# Patient Record
Sex: MALE | Race: WHITE | URBAN - METROPOLITAN AREA
[De-identification: names, ages, dates, MRNs, and addresses within clinical notes are randomized per-mention and may not be internally consistent; named-entity substitution may affect disease eponyms.]

---

## 2019-07-31 ENCOUNTER — TRANSFERRED RECORDS (OUTPATIENT)
Dept: HEALTH INFORMATION MANAGEMENT | Facility: CLINIC | Age: 74
End: 2019-07-31

## 2019-07-31 ENCOUNTER — MEDICAL CORRESPONDENCE (OUTPATIENT)
Dept: HEALTH INFORMATION MANAGEMENT | Facility: CLINIC | Age: 74
End: 2019-07-31

## 2019-08-01 DIAGNOSIS — H35.372 EPIRETINAL MEMBRANE (ERM) OF LEFT EYE: Primary | ICD-10-CM

## 2019-08-08 ENCOUNTER — TELEPHONE (OUTPATIENT)
Dept: OPHTHALMOLOGY | Facility: CLINIC | Age: 74
End: 2019-08-08

## 2019-08-08 NOTE — TELEPHONE ENCOUNTER
Called patient to schedule procedure with Dr. Canales, there was no answer. Left message with my direct line [phone].

## 2019-08-08 NOTE — TELEPHONE ENCOUNTER
Patient is scheduled for surgery with Dr. Canales     Spoke or left message with: patient    Date of Surgery: 9/10/19    [place] [facility] [place]    Informed patient they will need an adult  Yes    Pre-op with surgeon (if applicable): 9/9/19 see and do evalaution with Dr. Canales    H&P: Scheduled with Dr. Brewer at [facility]    Additional imaging/appointments: post op appointment scheduled.    Surgery packet: mailed 8/8/19    Additional comments: Advised RN will afsaneh 1 - 3 days prior with arrival time and instructions. Will see Dr. Kaden Berumen for other post op appointments.

## 2019-09-05 RX ORDER — CELECOXIB 200 MG/1
200 CAPSULE ORAL DAILY
COMMUNITY

## 2019-09-05 RX ORDER — WARFARIN SODIUM 5 MG/1
5 TABLET ORAL DAILY
COMMUNITY

## 2019-09-05 RX ORDER — MULTIVITAMIN,THERAPEUTIC
1 TABLET ORAL DAILY
COMMUNITY

## 2019-09-05 RX ORDER — COLCHICINE 0.6 MG/1
0.6 TABLET ORAL DAILY
COMMUNITY

## 2019-09-05 RX ORDER — LOSARTAN POTASSIUM 100 MG/1
100 TABLET ORAL DAILY
COMMUNITY

## 2019-09-05 RX ORDER — TRAMADOL HYDROCHLORIDE 50 MG/1
50 TABLET ORAL
COMMUNITY

## 2019-09-05 RX ORDER — CYCLOBENZAPRINE HCL 10 MG
10 TABLET ORAL 3 TIMES DAILY PRN
COMMUNITY

## 2019-09-05 RX ORDER — PANTOPRAZOLE SODIUM 40 MG/1
40 TABLET, DELAYED RELEASE ORAL
COMMUNITY

## 2019-09-05 RX ORDER — GABAPENTIN 600 MG/1
600 TABLET ORAL
COMMUNITY

## 2019-09-05 RX ORDER — HYDROXYCHLOROQUINE SULFATE 200 MG/1
200 TABLET, FILM COATED ORAL
COMMUNITY

## 2019-09-05 RX ORDER — TAMSULOSIN HYDROCHLORIDE 0.4 MG/1
0.4 CAPSULE ORAL
COMMUNITY

## 2019-09-05 RX ORDER — FINASTERIDE 5 MG/1
5 TABLET, FILM COATED ORAL DAILY
COMMUNITY

## 2019-09-05 RX ORDER — ROSUVASTATIN CALCIUM 40 MG/1
40 TABLET, COATED ORAL
COMMUNITY

## 2019-09-09 ENCOUNTER — ANESTHESIA EVENT (OUTPATIENT)
Dept: SURGERY | Facility: AMBULATORY SURGERY CENTER | Age: 74
End: 2019-09-09

## 2019-09-09 ENCOUNTER — OFFICE VISIT (OUTPATIENT)
Dept: OPHTHALMOLOGY | Facility: CLINIC | Age: 74
End: 2019-09-09
Attending: OPHTHALMOLOGY
Payer: COMMERCIAL

## 2019-09-09 DIAGNOSIS — H35.372 EPIRETINAL MEMBRANE (ERM) OF LEFT EYE: Primary | ICD-10-CM

## 2019-09-09 DIAGNOSIS — H43.812 VITREOUS DEGENERATION AND DETACHMENT OF LEFT EYE: ICD-10-CM

## 2019-09-09 PROCEDURE — 92134 CPTRZ OPH DX IMG PST SGM RTA: CPT | Mod: ZF | Performed by: OPHTHALMOLOGY

## 2019-09-09 PROCEDURE — G0463 HOSPITAL OUTPT CLINIC VISIT: HCPCS | Mod: ZF

## 2019-09-09 ASSESSMENT — VISUAL ACUITY
CORRECTION_TYPE: GLASSES
OS_PH_CC+: -1
METHOD: SNELLEN - LINEAR
OD_CC+: -3
OS_PH_CC: 20/80
OD_CC: 20/25
OS_CC: 20/100

## 2019-09-09 ASSESSMENT — REFRACTION_WEARINGRX
SPECS_TYPE: PAL
OS_SPHERE: +0.25
OD_SPHERE: PLANO
OD_CYLINDER: +0.75
OD_ADD: +2.00
OD_AXIS: 165
OS_CYLINDER: +0.50
OS_AXIS: 146
OS_ADD: +2.00

## 2019-09-09 ASSESSMENT — TONOMETRY
OD_IOP_MMHG: 13
IOP_METHOD: TONOPEN
OS_IOP_MMHG: 12

## 2019-09-09 ASSESSMENT — EXTERNAL EXAM - LEFT EYE: OS_EXAM: NORMAL

## 2019-09-09 ASSESSMENT — CUP TO DISC RATIO
OD_RATIO: 0.1
OS_RATIO: 0.1

## 2019-09-09 ASSESSMENT — CONF VISUAL FIELD
OS_NORMAL: 1
METHOD: COUNTING FINGERS
OD_NORMAL: 1

## 2019-09-09 ASSESSMENT — EXTERNAL EXAM - RIGHT EYE: OD_EXAM: NORMAL

## 2019-09-09 ASSESSMENT — SLIT LAMP EXAM - LIDS
COMMENTS: NORMAL
COMMENTS: NORMAL

## 2019-09-09 NOTE — PROGRESS NOTES
"CC -   ERM left eye     INTERVAL HISTORY - Initial visit  He is schedule with Dr Canales for surery on 9-10-19.        HPI -   Brandt Diallo is a  73 year old year-old patient referred by Dr Kaden Berumen for evaluation and treat of an ERM left eye.   VA OS noted to worsen since 2016.  Seen 2016 by Dr. Berumen found VA OS 20/30, has distortion OS, image interferes, sees diplopia  Noted ET OS as child per patient, told has \"lazy eye\" but no patching, no strabismus as adult, feels VA OS not as good as OD  No DM    PAST OCULAR SURGERY  None    RETINAL IMAGING:  OCT  9-9-19  OD - retina normal, PHF attached  OS - ERM, moderate distortion, mild CME      ASSESSMENT & PLAN    1. ERM OS   - advised PPV/MS   - scheduled with Parker tomorrow     - r/b/a d/w patient: vision loss, blindness, infection, bleeding   - retinal detachment, need for more surgeries, need for gas or oil bubble and bubble restrictions   - cataract, diplopia, refractive change   - persistent blurriness, distortion, or scotoma   - participation of fellow or resident      2. PVD OS, syneresis OD      3. NS OU        return to clinic: for Surgery tomorrow with Parker    ATTESTATION     Attending Attestation:     Complete documentation of historical and exam elements from today's encounter can be found in the full encounter summary report (not reduplicated in this progress note).  I personally obtained the chief complaint(s) and history of present illness.  I confirmed and edited as necessary the review of systems, past medical/surgical history, family history, social history, and examination findings as documented by others; and I examined the patient myself.  I personally reviewed the relevant tests, images, and reports as documented above.  I formulated and edited as necessary the assessment and plan and discussed the findings and management plan with the patient and family    Radha Spann MD, PhD  , Vitreoretinal " Surgery  Department of Ophthalmology  [facility]

## 2019-09-09 NOTE — ANESTHESIA PREPROCEDURE EVALUATION
Anesthesia Pre-Procedure Evaluation    Patient: Brandt Solimanhl   MRN:     [id number] Gender:   male   Age:    73 year old :      1945        Preoperative Diagnosis: Epiretinal Membrane   Procedure(s):  Left Eye 23 Vitrectomy, Fluid Gas Exchange, Membrane Peel     Past Medical History:   Diagnosis Date     Anemia      Atrial fibrillation (H)      Lake's esophagus      Chronic vascular insufficiency of intestine (H)      Degeneration of intervertebral disc at C4-C5 level      Elevated liver function tests      Gastroesophageal reflux disease      Hyperlipidemia      Hypertension      Long term current use of anticoagulant      Neuropathy       No past surgical history on file.                PHYSICAL EXAM:   Mental Status/Neuro: A/A/O   Airway: Facies: Feasible  Mallampati: II  Mouth/Opening: Full  TM distance: > 6 cm  Neck ROM: Full   Respiratory: Auscultation: CTAB     Resp. Rate: Normal     Resp. Effort: Normal      CV: Rhythm: Regular  Rate: Age appropriate  Heart: Normal Sounds  Edema: None   Comments:                      LABS:  CBC: No results found for: WBC, HGB, HCT, PLT  BMP: No results found for: NA, POTASSIUM, CHLORIDE, CO2, BUN, CR, GLC  COAGS: No results found for: PTT, INR, FIBR  POC: No results found for: BGM, HCG, HCGS  OTHER: No results found for: PH, LACT, A1C, CHRISTOPHER, PHOS, MAG, ALBUMIN, PROTTOTAL, ALT, AST, GGT, ALKPHOS, BILITOTAL, BILIDIRECT, LIPASE, AMYLASE, ALLAN, TSH, T4, T3, CRP, SED     Preop Vitals    BP Readings from Last 3 Encounters:   No data found for BP    Pulse Readings from Last 3 Encounters:   No data found for Pulse      Resp Readings from Last 3 Encounters:   No data found for Resp    SpO2 Readings from Last 3 Encounters:   No data found for SpO2      Temp Readings from Last 1 Encounters:   No data found for Temp    Ht Readings from Last 1 Encounters:   No data found for Ht      Wt Readings from Last 1 Encounters:   No data found for Wt    There is no height or weight  on file to calculate BMI.     LDA:        Assessment:   ASA SCORE: 3    H&P: History and physical reviewed and following examination; no interval change.    NPO Status: NPO Appropriate     Plan:   Anes. Type:  MAC   Pre-Medication: None   Induction:  N/a   Airway: Native Airway   Access/Monitoring: PIV   Maintenance: N/a     Postop Plan:   Postop Pain: None  Postop Sedation/Airway: Not planned  Disposition: Outpatient     PONV Management:    Prevention: Ondansetron, Dexamethasone     CONSENT: Direct conversation   Plan and risks discussed with: Patient          Comments for Plan/Consent:  MAC + Retrobulbar  INR??                 Jamshid Peres MD     ANESTHESIA PREOP EVALUATION    PROCEDURE: Procedure(s):  Left Eye 23 Vitrectomy, Fluid Gas Exchange, Membrane Peel    HPI: Brandt Diallo is a 73 year old male who presents for above procedure 2/2 epiretinal membrane.     PAST MEDICAL HISTORY:    Past Medical History:   Diagnosis Date     Anemia      Atrial fibrillation (H)      Lake's esophagus      Chronic vascular insufficiency of intestine (H)      Degeneration of intervertebral disc at C4-C5 level      Elevated liver function tests      Gastroesophageal reflux disease      Hyperlipidemia      Hypertension      Long term current use of anticoagulant      Neuropathy        PAST SURGICAL HISTORY:    No past surgical history on file.    SOCIAL HISTORY:       Social History     Tobacco Use     Smoking status: Former Smoker     Types: Cigarettes     Last attempt to quit: 1953     Years since quittin.7     Smokeless tobacco: Never Used   Substance Use Topics     Alcohol use: Not on file       ALLERGIES:     No Known Allergies    MEDICATIONS:       (Not in a hospital admission)    Current Outpatient Medications   Medication Sig Dispense Refill     albuterol (PROAIR RESPICLICK) 108 (90 Base) MCG/ACT inhaler        celecoxib (CELEBREX) 200 MG capsule Take 200 mg by mouth daily       colchicine (COLCYRS) 0.6 MG  tablet Take 0.6 mg by mouth daily       cyclobenzaprine (FLEXERIL) 10 MG tablet Take 10 mg by mouth 3 times daily as needed for muscle spasms       finasteride (PROSCAR) 5 MG tablet Take 5 mg by mouth daily       gabapentin (NEURONTIN) 600 MG tablet Take 600 mg by mouth 2 times daily       hydroxychloroquine (PLAQUENIL) 200 MG tablet Take 200 mg by mouth       losartan (COZAAR) 100 MG tablet Take 100 mg by mouth daily       multivitamin, therapeutic (THERA-VIT) TABS tablet Take 1 tablet by mouth daily       rosuvastatin (CRESTOR) 40 MG tablet Take 40 mg by mouth       tamsulosin (FLOMAX) 0.4 MG capsule Take 0.4 mg by mouth       traMADol (ULTRAM) 50 MG tablet Take 50 mg by mouth       warfarin (COUMADIN) 5 MG tablet Take 5 mg by mouth daily Take two tablets by mouth Tuesday, Friday and take one and one and half tablets on all other days of the week as directed       pantoprazole (PROTONIX) 40 MG EC tablet Take 40 mg by mouth         Current Outpatient Medications Ordered in Epic   Medication Sig Dispense Refill     albuterol (PROAIR RESPICLICK) 108 (90 Base) MCG/ACT inhaler        celecoxib (CELEBREX) 200 MG capsule Take 200 mg by mouth daily       colchicine (COLCYRS) 0.6 MG tablet Take 0.6 mg by mouth daily       cyclobenzaprine (FLEXERIL) 10 MG tablet Take 10 mg by mouth 3 times daily as needed for muscle spasms       finasteride (PROSCAR) 5 MG tablet Take 5 mg by mouth daily       gabapentin (NEURONTIN) 600 MG tablet Take 600 mg by mouth 2 times daily       hydroxychloroquine (PLAQUENIL) 200 MG tablet Take 200 mg by mouth       losartan (COZAAR) 100 MG tablet Take 100 mg by mouth daily       multivitamin, therapeutic (THERA-VIT) TABS tablet Take 1 tablet by mouth daily       rosuvastatin (CRESTOR) 40 MG tablet Take 40 mg by mouth       tamsulosin (FLOMAX) 0.4 MG capsule Take 0.4 mg by mouth       traMADol (ULTRAM) 50 MG tablet Take 50 mg by mouth       warfarin (COUMADIN) 5 MG tablet Take 5 mg by mouth daily  Take two tablets by mouth Tuesday, Friday and take one and one and half tablets on all other days of the week as directed       pantoprazole (PROTONIX) 40 MG EC tablet Take 40 mg by mouth       No current Epic-ordered facility-administered medications on file.        PHYSICAL EXAM:    Vitals: T Data Unavailable, P Data Unavailable, BP Data Unavailable, R Data Unavailable, SpO2  , Weight Wt Readings from Last 2 Encounters:   No data found for Wt       See doc flowsheet    NPO STATUS: see doc flowsheet    LABS:    BMP:  No results for input(s): NA, POTASSIUM, CHLORIDE, CO2, BUN, CR, GLC, CHRISTOPHER in the last 59504 hours.    LFTs:   No results for input(s): PROTTOTAL, ALBUMIN, BILITOTAL, ALKPHOS, AST, ALT, BILIDIRECT in the last 47849 hours.    CBC:   No results for input(s): WBC, RBC, HGB, HCT, MCV, MCH, MCHC, RDW, PLT in the last 57064 hours.    Coags:  No results for input(s): INR, PTT, FIBR in the last 24520 hours.    Imaging:  No orders to display       Jamshid Peres MD  Anesthesiology Staff  Pager [phone]    9/9/2019  4:15 PM

## 2019-09-09 NOTE — NURSING NOTE
Chief Complaints and History of Present Illnesses   Patient presents with     Epiretinal Membrane Evaluation     Referred by Dr. Zak Berumen      Chief Complaint(s) and History of Present Illness(es)     Epiretinal Membrane Evaluation     Comments: Referred by Dr. Zak Berumen               Comments     Pt feels his vision is stable in both eyes for the last few months.  Complains of occasional diplopia and LE becoming fatigued easily.  Hx of floaters- unchanged.  Denies any flashes, pain, pressure, irritation, discharge, and tearing.    Nuria Beckham OT 12:08 PM September 9, 2019

## 2019-09-10 ENCOUNTER — HOSPITAL ENCOUNTER (OUTPATIENT)
Facility: AMBULATORY SURGERY CENTER | Age: 74
End: 2019-09-10
Attending: OPHTHALMOLOGY
Payer: MEDICARE

## 2019-09-10 ENCOUNTER — ANESTHESIA (OUTPATIENT)
Dept: SURGERY | Facility: AMBULATORY SURGERY CENTER | Age: 74
End: 2019-09-10

## 2019-09-10 VITALS
HEIGHT: 72 IN | RESPIRATION RATE: 16 BRPM | TEMPERATURE: 98.3 F | SYSTOLIC BLOOD PRESSURE: 127 MMHG | DIASTOLIC BLOOD PRESSURE: 80 MMHG | WEIGHT: 205 LBS | HEART RATE: 57 BPM | BODY MASS INDEX: 27.77 KG/M2 | OXYGEN SATURATION: 94 %

## 2019-09-10 DIAGNOSIS — Z48.810 AFTERCARE FOLLOWING SURGERY OF A SENSE ORGAN: Primary | ICD-10-CM

## 2019-09-10 DIAGNOSIS — H35.372 EPIRETINAL MEMBRANE (ERM) OF LEFT EYE: ICD-10-CM

## 2019-09-10 LAB — INR PPP: 1.07 (ref 0.86–1.14)

## 2019-09-10 RX ORDER — NEOMYCIN POLYMYXIN B SULFATES AND DEXAMETHASONE 3.5; 10000; 1 MG/ML; [USP'U]/ML; MG/ML
1 SUSPENSION/ DROPS OPHTHALMIC 4 TIMES DAILY
Qty: 5 ML | Refills: 0 | Status: SHIPPED | OUTPATIENT
Start: 2019-09-10

## 2019-09-10 RX ORDER — CYCLOPENTOLATE HYDROCHLORIDE 10 MG/ML
1 SOLUTION/ DROPS OPHTHALMIC
Status: COMPLETED | OUTPATIENT
Start: 2019-09-10 | End: 2019-09-10

## 2019-09-10 RX ORDER — SODIUM CHLORIDE, SODIUM LACTATE, POTASSIUM CHLORIDE, CALCIUM CHLORIDE 600; 310; 30; 20 MG/100ML; MG/100ML; MG/100ML; MG/100ML
INJECTION, SOLUTION INTRAVENOUS CONTINUOUS
Status: DISCONTINUED | OUTPATIENT
Start: 2019-09-10 | End: 2019-09-11 | Stop reason: HOSPADM

## 2019-09-10 RX ORDER — FENTANYL CITRATE 50 UG/ML
INJECTION, SOLUTION INTRAMUSCULAR; INTRAVENOUS PRN
Status: DISCONTINUED | OUTPATIENT
Start: 2019-09-10 | End: 2019-09-10

## 2019-09-10 RX ORDER — ONDANSETRON 2 MG/ML
4 INJECTION INTRAMUSCULAR; INTRAVENOUS EVERY 30 MIN PRN
Status: DISCONTINUED | OUTPATIENT
Start: 2019-09-10 | End: 2019-09-11 | Stop reason: HOSPADM

## 2019-09-10 RX ORDER — PHENYLEPHRINE HYDROCHLORIDE 25 MG/ML
SOLUTION/ DROPS OPHTHALMIC PRN
Status: DISCONTINUED | OUTPATIENT
Start: 2019-09-10 | End: 2019-09-10 | Stop reason: HOSPADM

## 2019-09-10 RX ORDER — PROPOFOL 10 MG/ML
INJECTION, EMULSION INTRAVENOUS PRN
Status: DISCONTINUED | OUTPATIENT
Start: 2019-09-10 | End: 2019-09-10

## 2019-09-10 RX ORDER — LIDOCAINE 40 MG/G
CREAM TOPICAL
Status: DISCONTINUED | OUTPATIENT
Start: 2019-09-10 | End: 2019-09-10 | Stop reason: HOSPADM

## 2019-09-10 RX ORDER — PHENYLEPHRINE HYDROCHLORIDE 25 MG/ML
1 SOLUTION/ DROPS OPHTHALMIC
Status: COMPLETED | OUTPATIENT
Start: 2019-09-10 | End: 2019-09-10

## 2019-09-10 RX ORDER — DEXAMETHASONE SODIUM PHOSPHATE 4 MG/ML
INJECTION, SOLUTION INTRA-ARTICULAR; INTRALESIONAL; INTRAMUSCULAR; INTRAVENOUS; SOFT TISSUE PRN
Status: DISCONTINUED | OUTPATIENT
Start: 2019-09-10 | End: 2019-09-10 | Stop reason: HOSPADM

## 2019-09-10 RX ORDER — SODIUM CHLORIDE, SODIUM LACTATE, POTASSIUM CHLORIDE, CALCIUM CHLORIDE 600; 310; 30; 20 MG/100ML; MG/100ML; MG/100ML; MG/100ML
INJECTION, SOLUTION INTRAVENOUS CONTINUOUS
Status: DISCONTINUED | OUTPATIENT
Start: 2019-09-10 | End: 2019-09-10 | Stop reason: HOSPADM

## 2019-09-10 RX ORDER — ONDANSETRON 2 MG/ML
INJECTION INTRAMUSCULAR; INTRAVENOUS PRN
Status: DISCONTINUED | OUTPATIENT
Start: 2019-09-10 | End: 2019-09-10

## 2019-09-10 RX ORDER — ONDANSETRON 4 MG/1
4 TABLET, ORALLY DISINTEGRATING ORAL EVERY 30 MIN PRN
Status: DISCONTINUED | OUTPATIENT
Start: 2019-09-10 | End: 2019-09-11 | Stop reason: HOSPADM

## 2019-09-10 RX ORDER — LIDOCAINE HYDROCHLORIDE 20 MG/ML
INJECTION, SOLUTION INFILTRATION; PERINEURAL PRN
Status: DISCONTINUED | OUTPATIENT
Start: 2019-09-10 | End: 2019-09-10

## 2019-09-10 RX ORDER — ACETAMINOPHEN 325 MG/1
975 TABLET ORAL ONCE
Status: COMPLETED | OUTPATIENT
Start: 2019-09-10 | End: 2019-09-10

## 2019-09-10 RX ORDER — BALANCED SALT SOLUTION 6.4; .75; .48; .3; 3.9; 1.7 MG/ML; MG/ML; MG/ML; MG/ML; MG/ML; MG/ML
SOLUTION OPHTHALMIC PRN
Status: DISCONTINUED | OUTPATIENT
Start: 2019-09-10 | End: 2019-09-10 | Stop reason: HOSPADM

## 2019-09-10 RX ORDER — MEPERIDINE HYDROCHLORIDE 25 MG/ML
12.5 INJECTION INTRAMUSCULAR; INTRAVENOUS; SUBCUTANEOUS
Status: DISCONTINUED | OUTPATIENT
Start: 2019-09-10 | End: 2019-09-11 | Stop reason: HOSPADM

## 2019-09-10 RX ORDER — TROPICAMIDE 10 MG/ML
SOLUTION/ DROPS OPHTHALMIC PRN
Status: DISCONTINUED | OUTPATIENT
Start: 2019-09-10 | End: 2019-09-10 | Stop reason: HOSPADM

## 2019-09-10 RX ORDER — ATROPINE SULFATE 10 MG/ML
SOLUTION/ DROPS OPHTHALMIC PRN
Status: DISCONTINUED | OUTPATIENT
Start: 2019-09-10 | End: 2019-09-10 | Stop reason: HOSPADM

## 2019-09-10 RX ORDER — NALOXONE HYDROCHLORIDE 0.4 MG/ML
.1-.4 INJECTION, SOLUTION INTRAMUSCULAR; INTRAVENOUS; SUBCUTANEOUS
Status: DISCONTINUED | OUTPATIENT
Start: 2019-09-10 | End: 2019-09-11 | Stop reason: HOSPADM

## 2019-09-10 RX ADMIN — LIDOCAINE HYDROCHLORIDE 20 MG: 20 INJECTION, SOLUTION INFILTRATION; PERINEURAL at 08:53

## 2019-09-10 RX ADMIN — CYCLOPENTOLATE HYDROCHLORIDE 1 DROP: 10 SOLUTION/ DROPS OPHTHALMIC at 08:10

## 2019-09-10 RX ADMIN — PHENYLEPHRINE HYDROCHLORIDE 1 DROP: 25 SOLUTION/ DROPS OPHTHALMIC at 08:05

## 2019-09-10 RX ADMIN — FENTANYL CITRATE 50 MCG: 50 INJECTION, SOLUTION INTRAMUSCULAR; INTRAVENOUS at 08:50

## 2019-09-10 RX ADMIN — PHENYLEPHRINE HYDROCHLORIDE 1 DROP: 25 SOLUTION/ DROPS OPHTHALMIC at 08:10

## 2019-09-10 RX ADMIN — CYCLOPENTOLATE HYDROCHLORIDE 1 DROP: 10 SOLUTION/ DROPS OPHTHALMIC at 08:05

## 2019-09-10 RX ADMIN — ONDANSETRON 4 MG: 2 INJECTION INTRAMUSCULAR; INTRAVENOUS at 08:50

## 2019-09-10 RX ADMIN — SODIUM CHLORIDE, SODIUM LACTATE, POTASSIUM CHLORIDE, CALCIUM CHLORIDE: 600; 310; 30; 20 INJECTION, SOLUTION INTRAVENOUS at 07:56

## 2019-09-10 RX ADMIN — PHENYLEPHRINE HYDROCHLORIDE 1 DROP: 25 SOLUTION/ DROPS OPHTHALMIC at 08:00

## 2019-09-10 RX ADMIN — CYCLOPENTOLATE HYDROCHLORIDE 1 DROP: 10 SOLUTION/ DROPS OPHTHALMIC at 08:00

## 2019-09-10 RX ADMIN — PROPOFOL 30 MG: 10 INJECTION, EMULSION INTRAVENOUS at 08:53

## 2019-09-10 RX ADMIN — ACETAMINOPHEN 975 MG: 325 TABLET ORAL at 07:56

## 2019-09-10 ASSESSMENT — MIFFLIN-ST. JEOR: SCORE: 1702.87

## 2019-09-10 NOTE — DISCHARGE INSTRUCTIONS
[facility]  [phone]  Post Operative Eye Surgery Instructions    Gail Canales MD  Will I have pain?  Some discomfort is normal and expected following surgery. The first few days after surgery you may need to use prescription pain pills. Taking Tylenol (acetaminophen) regularly may help prevent pain.  Discomfort should gradually decrease and Tylenol should be sufficient to relieve pain. A foreign body sensation in the cornea of the eye is very common and caused by sutures placed at surgery. These sutures will go away in one to two weeks. If the pain worsens, you should call the doctor.  Do I need to wear an eye patch?  You do not need to wear an eye patch at home after the doctor has removed the patch on your first day after surgery. However, you may be more comfortable wearing a patch outside in the sun, when sleeping or napping, or in a morgan, windy environment.  How much drainage should I have?  You may expect a moderate amount of drainage for a week. Gradually the drainage should decrease. The lids can be cleaned with a clean washcloth and gentle soap or diluted baby shampoo. Wipe the eyelids gently from the nose outward. Some blood in the tears is a normal finding.  Will there be swelling?  Some swelling is normal for about a week or two after which it will gradually decrease. Applying a cool compress, using a clean washcloth for 5 - 10 minutes several times a day may reduce the swelling and make you more comfortable. People may have some swelling of both eyes, especially if face down positioning is required. The white part of the eye may appear very red or bloody for a week or two. This may get worse a few days after surgery. Though the bright red appearance can look frightening, it is a normal finding early after surgery and will resolve in a few weeks.  Will I need to use eye drops?  You will be using several different kinds of eye drops or ointment (salve) when you leave the hospital.  The directions will be on each bottle or tube. The medication with the red top will keep your eye dilated and may make your eye more sensitive to light. Wearing sunglasses may help. The other medication is a combination antibiotic/steroid to prevent infection and promote healing.  Occasionally a third drop is used to control the pressure in your eye. A new bottle of artificial tears or lubricant ointment may be used along with your prescription eye drops after surgery. You will be using drops from four to eight weeks. Bring all eye medications (drops. ointments, or pills) with you  to each visit.   Always wash your hands before putting in the eye drops. You may wish to have someone else help you. Pull down on the lower lid and squeeze one drop from the bottle being careful not to touch the dropper to your eye or eyelid. One drop is sufficient, but another may be used if the first did not go into the eye. It is often easier to put in the drops if you are reclining or lying down. Wait five (5) minutes after the first drop before using the second drop to allow the medications to absorb into the eye.  How long will it take for my vision to improve?  Your vision should gradually improve, but it may take up to six months to regain your best vision. Frequently, air or gas bubbles are injected into the eye at the time of surgery. This will blur your vision significantly at first. As the bubble becomes smaller it will cause a black line in your vision that moves as you move your head. As the bubble becomes smaller you may notice that it looks more like a bubble or that it will break up into several smaller bubbles. It will take from a few days to a few weeks for the bubble to dissolve and be replaced by clear fluid.  You may notice floaters or double vision after your surgery. These symptoms usually will decrease with time. If the double vision is bothersome patching the eye may help.  If you notice a sudden worsening in  your vision call your doctor.  Are there any physical restrictions after surgery?  If an air or gas bubble was placed in the eye during surgery, you will be asked to spend most of your time (both awake and during the night) with your head in a specific position, frequently face down. As the eye heals and the bubble dissolves there will be less of a need for you to stay in that specific position. You should avoid sleeping on your back until the bubble has totally dissolved and you have been given permission from your surgeon. You should not fly in an airplane or go to high altitudes in the mountains while there is a bubble in your eye. If you should require any other surgery, under general anesthesia, while you still have an air bubble in your eye, have your surgeon or anesthetist contact us prior to your surgery. Some anesthetic agents can make the bubble expand and seriously damage your eye.  Patients that have a gas/air bubble placed in their eye will have a green medical alert band on their wrist.  This band should not be removed until the doctor removes it for you or gives you permission to remove it.     Heavy lifting (greater than 50 pounds), swimming and contact sports should be avoided for about 3 to 4 weeks after surgery.  You may resume your usual sexual activities about one week after surgery.  When may I return to work or my normal activities?  Depending on the type or work, you may return to work within a few days. If your work involves physical activity or driving, you will need to restrict your activities and remain home longer.  You may watch television, look at magazines, or work puzzles. Reading may be uncomfortable for several days, but using the eyes will not cause any damage.  You may go outside as usual. If conditions are windy or morgan, wear an eye pad to avoid getting dust or dirt in the eye.  Can I travel?  You cannot fly in an airplane or drive into the mountains as long as the air or gas  bubble remains in your eye.    Are there any driving restrictions?  Someone will need to drive you home from the hospital. Generally driving can be resumed in several days if you have good vision in your other eye. If you do not feel comfortable driving, do not drive! Your depth perception will be decreased so you will want to try driving during the day in light traffic until you feel comfortable driving. You should restrict your driving while you are taking prescription pain pills as they also can affect your judgment.  When can I shower and wash my hair?  You may shower or bathe when you get home, but avoid getting water in your eye. You may want someone to help you shampoo your hair at first.  You may shave, brush your teeth, or comb your hair. Do not use make-up, mascara, or creams/lotions around your eye for several weeks.  When will I see the doctor again?  Generally, you will be seen the first day after surgery and again 1-2 weeks later. If you have not received a return appointment before leaving the hospital, you should call our office during the business hours to arrange an appointment. If you will be seeing your local doctor instead of us, you will need to call that office to set up an appointment.    If you have a green armband on, your physician will instruct you as to how long you will have to wear it at your post-operative follow-up appointment.  How do I reach a doctor if I have concerns?  One of our doctors is available by calling [facility] [phone]. Please try to call for routine questions and prescription refills during business hours.  You should call your doctor if:  You notice a sudden decrease in your vision.  Have severe pain or pain increases rather than subsiding.  You notice a new black curtain over your eye that is not the gas bubble. If you have any of these symptoms, you may need to be examined.    [facility]  Home Care  "Following Anesthesia  For 24 hours after surgery:  1. Get plenty of rest.  A responsible adult must stay with you for at least 24 hours after you leave the surgery center.  2. Do not drive or use heavy equipment.  If you have weakness or tingling, don't drive or use heavy equipment until this feeling goes away.   3. Do not drink alcohol.   4. Avoid strenuous or risky activities.  Ask for help when climbing stairs.  5. You may feel lightheaded.  IF so, sit for a few minutes before standing.  Have someone help you get up.   6. If you have nausea (feel sick to your stomach): Drink only clear liquids such as apple juice, ginger ale, broth or 7-Up.  Rest may also help.  Be sure to drink enough fluids.  Move to a regular diet as you feel able.   7. You may have a slight fever.  Call the doctor if your fever is over 100 F (37.7 C) (taken under the tongue) or lasts longer than 24 hours.  8. You may have a dry mouth, a sore throat, muscle aches or trouble sleeping. These should go away after 24 hours.  9. Do not make important or legal decisions.        Today you received a Marcaine or bupivacaine block to numb the nerves near your surgery site.  This is a block using local anesthetic or \"numbing\" medication injected around the nerves to anesthetize or \"numb\" the area supplied by those nerves.  This block is injected into the muscle layer near your surgical site.  The medication may numb the location where you had surgery for 6-18 hours, but may last up to 24 hours.  If your surgical site is an arm or leg you should be careful with your affected limb, since it is possible to injure your limb without being aware of it due to the numbing.  Until full feeling returns, you should guard against bumping or hitting your limb, and avoid extreme hot or cold temperatures on the skin.  As the block wears off, the feeling will return as a tingling or prickly sensation near your surgical site.  You will experience more discomfort from " your incision as the feeling returns.  You may want to take a pain pill (a narcotic or Tylenol if this was prescribed by your surgeon) when you start to experience mild pain before the pain beccomes more severe.  If your pain medications do not control your pain you should notifiy your surgeon.    Tips for taking pain medications  To get the best pain relief possible, remember these points:    Take pain medications as directed, before pain becomes severe.    Pain medication can upset your stomach: taking it with food may help.    Constipation is a common side effect of pain medication. Drink plenty of  fluids.    Eat foods high in fiber. Take a stool softener if recommended by your doctor or pharmacist.    Do not drink alcohol, drive or operate machinery while taking pain medications.    Ask about other ways to control pain, such as with heat, ice or relaxation.    Tylenol/Acetaminophen Consumption: You received 975 mg of Tylenol at 07:56 AM. Okay to take again at 01:56 PM-follow dosing instructions on bottle.   To help encourage the safe use of acetaminophen, the makers of TYLENOL  have lowered the maximum daily dose for single-ingredient Extra Strength TYLENOL  (acetaminophen) products sold in the [place] from 8 pills per day (4,000 mg) to 6 pills per day (3,000 mg). The dosing interval has also changed from 2 pills every 4-6 hours to 2 pills every 6 hours.    If you feel your pain relief is insufficient, you may take Tylenol/Acetaminophen in addition to your narcotic pain medication.     Be careful not to exceed 3,000 mg of Tylenol/Acetaminophen in a 24 hour period from all sources.    If you are taking extra strength Tylenol/acetaminophen (500 mg), the maximum dose is 6 tablets in 24 hours.    If you are taking regular strength acetaminophen (325 mg), the maximum dose is 9 tablets in 24 hours.    Call a doctor for any of the followin. Signs of infection (fever, growing tenderness at the surgery site, a large  amount of drainage or bleeding, severe pain, foul-smelling drainage, redness, swelling).  2. It has been over 8 to 10 hours since surgery and you are still not able to urinate (pass water).  3. Headache for over 24 hours.    Your doctor is:     Dr. Gail Canales, Ophthalmology: [phone]               Or dial [phone] and ask for the resident on call for:  Ophthalmology  For emergency care, call the:  [facility]:  [phone] (TTY for hearing impaired: [phone])

## 2019-09-10 NOTE — ANESTHESIA CARE TRANSFER NOTE
Patient: Brandt Diallo    Procedure(s):  Left Eye 25 Gauge [place] Vitrectomy, Air Fluid Exchange, Membrane Peel    Diagnosis: Epiretinal Membrane  Diagnosis Additional Information: No value filed.    Anesthesia Type:   MAC     Note:  Airway :Room Air  Patient transferred to:Phase II  Comments: Uneventful transport to Phase II: VSS; IV patent; pt comfortable; Report given to RN; pt. Responds appropriately to commandsHandoff Report: Identifed the Patient, Identified the Reponsible Provider, Reviewed the pertinent medical history, Discussed the surgical course, Reviewed Intra-OP anesthesia mangement and issues during anesthesia, Set expectations for post-procedure period and Allowed opportunity for questions and acknowledgement of understanding      Vitals: (Last set prior to Anesthesia Care Transfer)    CRNA VITALS  9/10/2019 0916 - 9/10/2019 0946      9/10/2019             NIBP:  127/83    NIBP Mean:  106                Electronically Signed By: JORGE Hart CRNA  September 10, 2019  9:46 AM

## 2019-09-10 NOTE — OP NOTE
SURGEON: LEYDI OLVERA MD  Assistant Surgeon: Fang Rivas MD  PREOPERATIVE DIAGNOSIS:  Epiretinal membrane left eye.   POSTOPERATIVE DIAGNOSIS: same   SURGERY    25 gauge pars plana vitectomy, membrane peel, air-fluid exchange left eye    Complications:    none   Anesthesia:    MAC and peribulbar block left eye    Blood loss:    Scant  INDICATIONS;   Brandt Diallo is a 73 year old patient with a diagnosis of an Epiretinal membrane. Complaining of chronic distortion of vision and decreased visual acuity. He is here for surgical repair  DESCRIPTION OF THE PROCEDURE:  The patient was taken to the operating room where intravenous sedation was administered by the anesthesia department and a retrobulbar block consisting of a 1:1 mixture of 2%lidocaine and 0.75% marcaine with wydase, was administered to the operative eye with adequate anesthesia and akinesia.    The eye was prepped and draped in the usual sterile surgical fashion for ophthalmic surgery, including the installation of one drop of 5% Povidone Iodine.  A sterile drape was placed over the face and body and a lid speculum was inserted.      A 25 gauge infusion cannula was placed 3.5 mm posterior to the limbus inferotemporally. The infusion cannula was connected and its intravitreal location was verified by direct visualization. The infusion was turned on.  Two other 25 gauge trocars were placed 3.5 mm posterior to the limbus at 10:00 o'clock and 2:00 o'clock position. The vitrectomy handpiece and endoilluminator were placed in the eye.  Upon entering the eye, it was noted the pt had an Epiretinal membrane. A vitrectomy was performed. Next approx 0.1 ml of kenalog was injected. Careful peripheral vitrectomy was performed with scleral depression 360 degrees. Next brillian blue was used to stain the membrabe. Using microtano and Epiretinal membrane forceps, the Epiretinal membrane over the macula was removed without complications.   Next, an air fluid  exchange was performed. Next, the trocars and the infusion line were removed. The sclerotomies and conjunctiva were closed with 6-0 plain gut sutures. Subconjunctival injections of ancef and dexamethasone were administed The lid speculum was removed. The IOP was low teens at the end of the case. The eye was cleaned with wet and dry gauze. Maxitrol ointment and Atropine drops were placed on the eye.  A patch and Huang shield were placed over the eye.   The patient tolerated well the procedure and was transferred to the PACU in stable condition.    The surgery was assisted by Dr. Javier Rivas, because no qualified resident was available on the day of the surgery. Due to the delicate and complex nature of this surgery, Dr. Javier Rivas was required. Dr. Javier Rivas assisted with cornea irrigation. I was present for the entire surgery.

## 2019-09-10 NOTE — BRIEF OP NOTE
[facility] Brief Operative Note    Pre-operative diagnosis: Epiretinal Membrane   Post-operative diagnosis Same   Procedure: Procedure(s):  Left Eye 25 Gauge Parsplana Vitrectomy, Air Fluid Exchange, Membrane Peel   Surgeon(s): Surgeon(s) and Role:     * Gail Canales MD - Primary     * Javier Sepulveda MD - Fellow - Assisting   Estimated blood loss: 1 mL    Specimens: * No specimens in log *   Findings:      Jvaier oL MD  Vitreoretinal surgery fellow  [facility]   Uneventful surgery

## 2019-09-10 NOTE — ANESTHESIA POSTPROCEDURE EVALUATION
Anesthesia POST Procedure Evaluation    Patient: Brandt Diallo   MRN:     [id number] Gender:   male   Age:    73 year old :      1945        Preoperative Diagnosis: Epiretinal Membrane   Procedure(s):  Left Eye 25 Gauge Parsplana Vitrectomy, Air Fluid Exchange, Membrane Peel   Postop Comments: No value filed.       Anesthesia Type:  Not documented  MAC    Reportable Event: NO     PAIN: Uncomplicated   Sign Out status: Comfortable, Well controlled pain     PONV: No PONV   Sign Out status:  No Nausea or Vomiting     Neuro/Psych: Uneventful perioperative course   Sign Out Status: Preoperative baseline; Age appropriate mentation     Airway/Resp.: Uneventful perioperative course   Sign Out Status: Non labored breathing, age appropriate RR; Resp. Status within EXPECTED Parameters     CV: Uneventful perioperative course   Sign Out status: Appropriate BP and perfusion indices; Appropriate HR/Rhythm     Disposition:   Sign Out in:  Phase II  Disposition:  Home  Recovery Course: Uneventful  Follow-Up: Not required           Last Anesthesia Record Vitals:  CRNA VITALS  9/10/2019 0916 - 9/10/2019 1016      9/10/2019             NIBP:  127/83    NIBP Mean:  106          Last PACU Vitals:  Vitals Value Taken Time   /79 9/10/2019  9:51 AM   Temp 36.7  C (98  F) 9/10/2019  9:51 AM   Pulse 52 9/10/2019  9:51 AM   Resp 16 9/10/2019  9:51 AM   SpO2 91 % 9/10/2019  9:51 AM   Temp src Available 9/10/2019  9:45 AM   NIBP 127/83 9/10/2019  9:46 AM   Pulse 69 9/10/2019  9:45 AM   SpO2 98 % 9/10/2019  9:45 AM   Resp     Temp     Ht Rate 70 9/10/2019  9:45 AM   Temp 2           Electronically Signed By: Jamshid Peres MD, September 10, 2019

## 2019-09-11 ENCOUNTER — OFFICE VISIT (OUTPATIENT)
Dept: OPHTHALMOLOGY | Facility: CLINIC | Age: 74
End: 2019-09-11
Attending: OPHTHALMOLOGY
Payer: COMMERCIAL

## 2019-09-11 DIAGNOSIS — Z48.810 AFTERCARE FOLLOWING SURGERY OF A SENSORY ORGAN: Primary | ICD-10-CM

## 2019-09-11 PROCEDURE — G0463 HOSPITAL OUTPT CLINIC VISIT: HCPCS | Mod: ZF

## 2019-09-11 ASSESSMENT — SLIT LAMP EXAM - LIDS
COMMENTS: NORMAL
COMMENTS: NORMAL

## 2019-09-11 ASSESSMENT — REFRACTION_WEARINGRX
OD_CYLINDER: +0.75
OS_ADD: +2.00
OS_CYLINDER: +0.50
OD_AXIS: 165
SPECS_TYPE: PAL
OD_ADD: +2.00
OS_SPHERE: +0.25
OD_SPHERE: PLANO
OS_AXIS: 146

## 2019-09-11 ASSESSMENT — VISUAL ACUITY
OD_CC: 20/25
CORRECTION_TYPE: GLASSES
METHOD: SNELLEN - LINEAR
OD_CC+: -2
OS_CC: 20/125

## 2019-09-11 ASSESSMENT — CUP TO DISC RATIO
OD_RATIO: 0.1
OS_RATIO: 0.1

## 2019-09-11 ASSESSMENT — EXTERNAL EXAM - RIGHT EYE: OD_EXAM: NORMAL

## 2019-09-11 ASSESSMENT — TONOMETRY
OS_IOP_MMHG: 09
OD_IOP_MMHG: 13
IOP_METHOD: TONOPEN

## 2019-09-11 ASSESSMENT — EXTERNAL EXAM - LEFT EYE: OS_EXAM: NORMAL

## 2019-09-11 NOTE — LETTER
9/11/2019    RE: Brandt Diallo  [address] [place] [address]     Dear Colleague,      Thank you for referring your patient, Brandt Diallo, to the EYE CLINIC at [facility]. Please see a copy of my visit note below.    Postoperative day 1 status post 25 g Pars plana vitrectomy (PPV)/ membrane peel air fluid exchange left eye   For Epiretinal membrane     Slept well  Retina attached  Doing well    Plan:  Position: not on back  No aviation  No heavy lifting   Huang shield at all times  Retina detachment and endophthalmitis precautions were discussed with the patient and was asked to return if any of the those occur    Medications to operative eye  Maxitrol (pink top) four times a day    Maxitrol oint at bedtime  Atropine (red top) once a day     Follow up in one week with Dr. Berumen.  ~~~~~~~~~~~~~~~~~~~~~~~~~~~~~~~~~~   Complete documentation of historical and exam elements from today's encounter can be found in the full encounter summary report (not reduplicated in this progress note).  I personally obtained the chief complaint(s) and history of present illness.  I confirmed and edited as necessary the review of systems, past medical/surgical history, family history, social history, and examination findings as documented by others; and I examined the patient myself.  I personally reviewed the relevant tests, images, and reports as documented above.  I formulated and edited as necessary the assessment and plan and discussed the findings and management plan with the patient and family    Again, thank you for allowing me to participate in the care of your patient.      Sincerely,    Gail Canales M.D.   of Ophthalmology  Vitreoretinal Surgeon  [facility] Endowed Chair  Department of Ophthalmology & Visual Neurosciences  [facility]  Phone:  [phone]   Fax:  [phone]

## 2019-09-11 NOTE — PATIENT INSTRUCTIONS
Position: not on back  No aviation  No heavy lifting   Huang shield at all times  Retina detachment and endophthalmitis precautions were discussed with the patient and was asked to return if any of the those occur    Medications to operative eye  Maxitrol (pink top) four times a day    Maxitrol oint at bedtime til gone  Atropine (red top) once a day  X 1 week  No heavy lifting x 3 weeks  Wear glasses or shield at all times x 3 weeks  No yard work or swimming x 3 weeks    Follow up in one week with Dr. Berumen.

## 2019-09-11 NOTE — PROGRESS NOTES
Postoperative day 1 status post 25 g Pars plana vitrectomy (PPV)/ membrane peel air fluid exchange left eye   For Epiretinal membrane     Slept well  Retina attached  Doing well    Plan:  Position: not on back  No aviation  No heavy lifting   Huang shield at all times  Retina detachment and endophthalmitis precautions were discussed with the patient and was asked to return if any of the those occur    Medications to operative eye  Maxitrol (pink top) four times a day    Maxitrol oint at bedtime  Atropine (red top) once a day     Follow up in one week with Dr. Berumen.  ~~~~~~~~~~~~~~~~~~~~~~~~~~~~~~~~~~   Complete documentation of historical and exam elements from today's encounter can be found in the full encounter summary report (not reduplicated in this progress note).  I personally obtained the chief complaint(s) and history of present illness.  I confirmed and edited as necessary the review of systems, past medical/surgical history, family history, social history, and examination findings as documented by others; and I examined the patient myself.  I personally reviewed the relevant tests, images, and reports as documented above.  I formulated and edited as necessary the assessment and plan and discussed the findings and management plan with the patient and family    Gail Canales MD  .  Retina Service   Department of Ophthalmology and Visual Neurosciences   [facility]  Phone: [phone]   Fax: [phone]

## 2019-09-11 NOTE — NURSING NOTE
Chief Complaints and History of Present Illnesses   Patient presents with     Post Op (Ophthalmology) Left Eye     Chief Complaint(s) and History of Present Illness(es)     Post Op (Ophthalmology) Left Eye               Comments     One day POP Left Eye 25 Gauge Parsplana Vitrectomy, Air Fluid Exchange, Membrane Peel Left   The patient notes that he doesn't have much eye pain.  He has an intermittent left eye ache.  Miroslava Leahy, COA, COA 8:35 AM 09/11/2019

## (undated) DEVICE — LINEN TOWEL PACK X5 5464

## (undated) DEVICE — EYE LENS VITRECTOMY MAGNIFYING ODVM

## (undated) DEVICE — SYR 05ML LL W/O NDL

## (undated) DEVICE — SU VICRYL 7-0 TG140-8DA 18" J546G

## (undated) DEVICE — EYE KIT AUTO GAS FOR CONSTELLATION 8065751014

## (undated) DEVICE — SYR 01ML 27GA 0.5" ECLIPSE 305789

## (undated) DEVICE — EYE SHIELD PLASTIC

## (undated) DEVICE — Device

## (undated) DEVICE — TAPE MICROPORE 2"X1.5YD 1530S-2

## (undated) DEVICE — TAPE MICROPORE 1"X1.5YD 1530S-1

## (undated) DEVICE — EYE CANN SOFT TIP 25GA FOR VALVED SET 8065149530

## (undated) DEVICE — EYE NDL RETROBULBAR ATKINSON 25GA 1.5" 581637

## (undated) DEVICE — GLOVE PROTEXIS MICRO 6.0  2D73PM60

## (undated) DEVICE — DRAPE MICRO 41X81"

## (undated) DEVICE — EYE PACK 25GA PLUS CONSTELLATION PPK4253

## (undated) DEVICE — PACK VITRECTOMY PQ15VI57E

## (undated) DEVICE — GLOVE PROTEXIS MICRO 7.0  2D73PM70

## (undated) DEVICE — EYE PACK 25GA CONSTELLATION 10,000 CPM PPK9380-02

## (undated) DEVICE — SU PLAIN 6-0 TG140-8 18" 1735G

## (undated) RX ORDER — ACETAMINOPHEN 325 MG/1
TABLET ORAL
Status: DISPENSED
Start: 2019-09-10

## (undated) RX ORDER — PROPOFOL 10 MG/ML
INJECTION, EMULSION INTRAVENOUS
Status: DISPENSED
Start: 2019-09-10

## (undated) RX ORDER — ONDANSETRON 2 MG/ML
INJECTION INTRAMUSCULAR; INTRAVENOUS
Status: DISPENSED
Start: 2019-09-10

## (undated) RX ORDER — FENTANYL CITRATE 50 UG/ML
INJECTION, SOLUTION INTRAMUSCULAR; INTRAVENOUS
Status: DISPENSED
Start: 2019-09-10

## (undated) RX ORDER — LIDOCAINE HYDROCHLORIDE 20 MG/ML
INJECTION, SOLUTION EPIDURAL; INFILTRATION; INTRACAUDAL; PERINEURAL
Status: DISPENSED
Start: 2019-09-10